# Patient Record
Sex: FEMALE | Race: WHITE | NOT HISPANIC OR LATINO | Employment: OTHER | URBAN - METROPOLITAN AREA
[De-identification: names, ages, dates, MRNs, and addresses within clinical notes are randomized per-mention and may not be internally consistent; named-entity substitution may affect disease eponyms.]

---

## 2017-06-16 ENCOUNTER — TELEPHONE (OUTPATIENT)
Dept: OBSTETRICS AND GYNECOLOGY | Facility: CLINIC | Age: 72
End: 2017-06-16

## 2017-06-20 ENCOUNTER — TELEPHONE (OUTPATIENT)
Dept: OBSTETRICS AND GYNECOLOGY | Facility: CLINIC | Age: 72
End: 2017-06-20

## 2017-06-20 NOTE — TELEPHONE ENCOUNTER
----- Message from Nico Lofton sent at 6/20/2017  2:24 PM CDT -----  Contact: mayco / Luisito Macias is checking status of forms faxed over .  Forms are needed by tomorrow 6/21/17.  Please call Mayoc at .    Thanks       06/20/2017 2:47 PM  Spoke with someone from [facility] and informed them that  wants to see pt before he puts in referral

## 2017-06-20 NOTE — TELEPHONE ENCOUNTER
----- Message from Luis Brown MD sent at 6/19/2017 12:29 PM CDT -----  Please make an appointment. I would like to see you in my office.   ----- Message -----  From: Dejah Sutton MA  Sent: 6/16/2017   9:04 AM  To: Luis Brown MD    Pt's insurance is requesting a referral to physical therapy for her knee and leaking urine.     06/20/2017 8:59 AM   Spoke with pt and informed her that  would like to see her in the office regarding her urine leakage pt stated she had appt scheduled for next week to come in.

## 2017-06-29 ENCOUNTER — OFFICE VISIT (OUTPATIENT)
Dept: OBSTETRICS AND GYNECOLOGY | Facility: CLINIC | Age: 72
End: 2017-06-29
Payer: MEDICARE

## 2017-06-29 ENCOUNTER — OFFICE VISIT (OUTPATIENT)
Dept: UROLOGY | Facility: CLINIC | Age: 72
End: 2017-06-29
Payer: MEDICARE

## 2017-06-29 VITALS
RESPIRATION RATE: 16 BRPM | HEART RATE: 68 BPM | BODY MASS INDEX: 29.44 KG/M2 | SYSTOLIC BLOOD PRESSURE: 112 MMHG | WEIGHT: 160 LBS | DIASTOLIC BLOOD PRESSURE: 60 MMHG | HEIGHT: 62 IN

## 2017-06-29 VITALS
BODY MASS INDEX: 29.62 KG/M2 | HEIGHT: 62 IN | DIASTOLIC BLOOD PRESSURE: 74 MMHG | WEIGHT: 160.94 LBS | SYSTOLIC BLOOD PRESSURE: 125 MMHG

## 2017-06-29 DIAGNOSIS — Z01.419 ENCOUNTER FOR GYNECOLOGICAL EXAMINATION WITHOUT ABNORMAL FINDING: ICD-10-CM

## 2017-06-29 DIAGNOSIS — Z79.890 HORMONE REPLACEMENT THERAPY (HRT): ICD-10-CM

## 2017-06-29 DIAGNOSIS — N39.46 MIXED INCONTINENCE URGE AND STRESS: Primary | ICD-10-CM

## 2017-06-29 DIAGNOSIS — Z90.710 STATUS POST HYSTERECTOMY: ICD-10-CM

## 2017-06-29 DIAGNOSIS — N39.3 URINARY, INCONTINENCE, STRESS FEMALE: ICD-10-CM

## 2017-06-29 DIAGNOSIS — N95.1 MENOPAUSAL STATE: Primary | ICD-10-CM

## 2017-06-29 DIAGNOSIS — R39.89 BLADDER PAIN: ICD-10-CM

## 2017-06-29 DIAGNOSIS — Z00.00 ANNUAL PHYSICAL EXAM: ICD-10-CM

## 2017-06-29 DIAGNOSIS — R92.30 DENSE BREAST TISSUE: ICD-10-CM

## 2017-06-29 PROCEDURE — 1126F AMNT PAIN NOTED NONE PRSNT: CPT | Mod: S$GLB,,, | Performed by: UROLOGY

## 2017-06-29 PROCEDURE — 1159F MED LIST DOCD IN RCRD: CPT | Mod: S$GLB,,, | Performed by: OBSTETRICS & GYNECOLOGY

## 2017-06-29 PROCEDURE — 99203 OFFICE O/P NEW LOW 30 MIN: CPT | Mod: S$GLB,,, | Performed by: UROLOGY

## 2017-06-29 PROCEDURE — 99999 PR PBB SHADOW E&M-EST. PATIENT-LVL III: CPT | Mod: PBBFAC,,, | Performed by: OBSTETRICS & GYNECOLOGY

## 2017-06-29 PROCEDURE — G0101 CA SCREEN;PELVIC/BREAST EXAM: HCPCS | Mod: S$GLB,,, | Performed by: OBSTETRICS & GYNECOLOGY

## 2017-06-29 PROCEDURE — 1159F MED LIST DOCD IN RCRD: CPT | Mod: S$GLB,,, | Performed by: UROLOGY

## 2017-06-29 PROCEDURE — 99999 PR PBB SHADOW E&M-EST. PATIENT-LVL III: CPT | Mod: PBBFAC,,, | Performed by: UROLOGY

## 2017-06-29 RX ORDER — OXYBUTYNIN CHLORIDE 5 MG/1
5 TABLET, EXTENDED RELEASE ORAL DAILY
Qty: 30 TABLET | Refills: 11 | Status: SHIPPED | OUTPATIENT
Start: 2017-06-29 | End: 2017-08-24 | Stop reason: SDUPTHER

## 2017-06-29 NOTE — PROGRESS NOTES
"  Subjective:       Bita Espinosa is a 71 y.o. female who is a new patient who was referred by Dr Brown for evaluation of UI.      Urinary Incontinence  Patient complains of urinary incontinence. This has been present for several years. She leaks urine with coughing, sneezing, with urge. Patient describes the symptoms as urge to urinate with little or no warning, urine leakage with coughing/heavy physical activity and urine leaking unpredictably. Factors associated with symptoms include none known. Evaluation to date includes none. Treatment to date includes none.     Reports TERRENCE and likely UUI though difficult to characterize the UUI. Also c/o intermittent bladder pressure/pain. Nocturia x 0. Denies UTIs. Denies KATRINA. Occasional constipation.     She went to [facility] for PFPT x 2 visits. Needs referral.    PVR (bladder scan) today - 0cc        The following portions of the patient's history were reviewed and updated as appropriate: allergies, current medications, past family history, past medical history, past social history, past surgical history and problem list.    Review of Systems  Constitutional: no fever or chills  ENT: no nasal congestion or sore throat  Respiratory: no cough or shortness of breath  Cardiovascular: no chest pain or palpitations  Gastrointestinal: no nausea or vomiting, tolerating diet  Genitourinary: as per HPI  Hematologic/Lymphatic: no easy bruising or lymphadenopathy  Musculoskeletal: no arthralgias or myalgias  Skin: no rashes or lesions  Neurological: no seizures or tremors  Behavioral/Psych: no auditory or visual hallucinations       Objective:    Vitals: /60   Pulse 68   Resp 16   Ht 5' 2" (1.575 m)   Wt 72.6 kg (160 lb)   BMI 29.26 kg/m²     Physical Exam   General: well developed, well nourished in no acute distress  Head: normocephalic, atraumatic  Neck: supple, trachea midline, no obvious enlargement of thyroid  HEENT: EOMI, mucus membranes moist, sclera anicteric, " no hearing impairment  Lungs: symmetric expansion, non-labored breathing  Cardiovascular: regular rate and rhythm, normal pulses  Abdomen: soft, non tender, non distended, no palpable masses, no hepatosplenomegaly, no hernias, no CVA tenderness  Musculoskeletal: no peripheral edema, normal ROM in bilateral upper and lower extremities  Lymphatics: no cervical or inguinal lymphadenopathy  Skin: no rashes or lesions  Neuro: alert and oriented x 3, no gross deficits  Psych: normal judgment and insight, normal mood/affect and non-anxious  Genitourinary:   patient declined exam      Lab Review   Urine analysis today in clinic shows negative for all components     No results found for: WBC, HGB, HCT, MCV, PLT  No results found for: CREATININE, BUN      Imaging  NA       Assessment/Plan:      1. Mixed incontinence urge and stress    - Discussed difference of UUI and TERRENCE components. Reviewed etiology and workup of each.   - TERRENCE: Kegels, PFPT, pessary, bulking agent, MUS.   - UUI: Behavioral changes, PFPT, anticholinergics, mirabegron. Botox/InterStim for refractory UUI.   - Will trial Ditropan XL 5mg   - PFPT for TERRENCE. May need MUS.   - May need UDS if MUS considered     2. Bladder pain    - PFPT         Follow up in 2 months

## 2017-06-29 NOTE — PROGRESS NOTES
Subjective:      Chief Complaint:  USI  Chief Complaint   Patient presents with    Gynecologic Exam       Menstrual History:    OB History      Para Term  AB Living    2         2    SAB TAB Ectopic Multiple Live Births                       Menarche age: 15     No LMP recorded. Patient has had a hysterectomy.               Objective:        History of Present Illness AND  Examination detailed DICTATE:    MENOPAUSAL   GR  2         PAST   HX   NOTED   SURGICAL  HX NOTED  USI   INVOLUNTARY   LOSS  OF   URINE URO  CONSULT                    Physical Exam   Constitutional: She is oriented to person, place, and time. She appears well-developed and well-nourished. No distress.   HENT:   Head: Normoc  Neck: Neck supple..   Cardiovascular: Normal rate, regular rhythm and normal heart sounds. No murmur heard.  Pulmonary/Chest: Effort normal and breath sounds normal. No respiratory distress. She has no wheezes. She has no rales.   Abdominal: Bowel sounds are normal. She exhibits no distension and no mass. There is no tenderness. There is no rebound and no guarding VENTRAL   HERNIA  SMALL  Musculoskeletal: Normal range of motion.   Lymphadenopathy:        Right: No inguinal adenopathy present.        Left: No inguinal adenopathy present.   Neurological: She is alert and oriented.  Skin: Skin is warm. No rash noted.    BREASTNODULAR   NOSKIN   CHANGES   NO  DISCHARGE  AX   NEG      PELVICEXT  NORMAL  BUS   NEG   VAGINA   GOOD   RUGAL   PATTERN   GOOD  SUPORT   CX AND   UT  IS   ABSENT   ADNEXA  NOT  PALPABLE        Review of Systems  Review of Systems   Normal ROS:   Constitutional: Negative for fever, chills, activity change fatigue and unexpected weight rowe  Eyes: REACT   Respiratory: Negative for shortness of breath and wheezing.    Cardiovascular: Negative for chest pain, palpitations.   Gastrointestinal: Negative for abdominal pain, diarrhea, constipation, blood in stool and abdominal distention.    Musculoskeletal: Negative for back pain.   Allergic/Immunologic: Negative  Neurological: Negative .   Hematological: Negative .   Psychiatric/Behavioral: Negative.    Assessment:      Diagnosis: MENOPAUSAL    USI  GYN  EXAM       Plan:      Return in 12  months

## 2017-07-20 ENCOUNTER — TELEPHONE (OUTPATIENT)
Dept: OBSTETRICS AND GYNECOLOGY | Facility: CLINIC | Age: 72
End: 2017-07-20

## 2017-07-20 NOTE — TELEPHONE ENCOUNTER
----- Message from Reema Dill sent at 7/19/2017  4:10 PM CDT -----  Contact: self  Patient is requesting to be scheduled for breast u/s , mammogram , & bone density @[facility] in [place] . Their phone # is [phone] .   pts #     LL

## 2017-07-20 NOTE — TELEPHONE ENCOUNTER
Left pt vm stating that  is out of the office until Tuesday and we will call her when her orders are faxed over.

## 2017-07-26 ENCOUNTER — TELEPHONE (OUTPATIENT)
Dept: OBSTETRICS AND GYNECOLOGY | Facility: CLINIC | Age: 72
End: 2017-07-26

## 2017-07-26 NOTE — TELEPHONE ENCOUNTER
----- Message from Ania Baum sent at 7/26/2017  9:13 AM CDT -----  Contact: self  Patient called back to get orders faxed over to [facility]. Please contact patient at [phone] when completed.    Thanks!

## 2017-08-23 ENCOUNTER — TELEPHONE (OUTPATIENT)
Dept: OBSTETRICS AND GYNECOLOGY | Facility: CLINIC | Age: 72
End: 2017-08-23

## 2017-08-23 NOTE — TELEPHONE ENCOUNTER
----- Message from Reema Dill sent at 8/22/2017  2:16 PM CDT -----  Contact: self  Patient is requesting orders to be re-faxed to [facility] scheduling dept . Fax # [phone]   pts # [phone]

## 2017-08-24 ENCOUNTER — OFFICE VISIT (OUTPATIENT)
Dept: UROLOGY | Facility: CLINIC | Age: 72
End: 2017-08-24
Payer: MEDICARE

## 2017-08-24 VITALS
BODY MASS INDEX: 29.45 KG/M2 | HEART RATE: 68 BPM | HEIGHT: 62 IN | DIASTOLIC BLOOD PRESSURE: 60 MMHG | WEIGHT: 160.06 LBS | SYSTOLIC BLOOD PRESSURE: 122 MMHG | RESPIRATION RATE: 14 BRPM

## 2017-08-24 DIAGNOSIS — R39.89 BLADDER PAIN: ICD-10-CM

## 2017-08-24 DIAGNOSIS — N39.46 MIXED INCONTINENCE URGE AND STRESS: Primary | ICD-10-CM

## 2017-08-24 PROCEDURE — 1125F AMNT PAIN NOTED PAIN PRSNT: CPT | Mod: S$GLB,,, | Performed by: UROLOGY

## 2017-08-24 PROCEDURE — 3008F BODY MASS INDEX DOCD: CPT | Mod: S$GLB,,, | Performed by: UROLOGY

## 2017-08-24 PROCEDURE — 1159F MED LIST DOCD IN RCRD: CPT | Mod: S$GLB,,, | Performed by: UROLOGY

## 2017-08-24 PROCEDURE — 99999 PR PBB SHADOW E&M-EST. PATIENT-LVL III: CPT | Mod: PBBFAC,,, | Performed by: UROLOGY

## 2017-08-24 PROCEDURE — 99214 OFFICE O/P EST MOD 30 MIN: CPT | Mod: S$GLB,,, | Performed by: UROLOGY

## 2017-08-24 RX ORDER — DULOXETIN HYDROCHLORIDE 60 MG/1
60 CAPSULE, DELAYED RELEASE ORAL NIGHTLY
Refills: 6 | COMMUNITY
Start: 2017-08-02

## 2017-08-24 RX ORDER — PRAMIPEXOLE DIHYDROCHLORIDE 1 MG/1
TABLET ORAL
Refills: 5 | COMMUNITY
Start: 2017-08-02

## 2017-08-24 RX ORDER — OXYBUTYNIN CHLORIDE 5 MG/1
5 TABLET, EXTENDED RELEASE ORAL DAILY
Qty: 90 TABLET | Refills: 3 | Status: SHIPPED | OUTPATIENT
Start: 2017-08-24 | End: 2018-09-27 | Stop reason: SDUPTHER

## 2017-08-24 NOTE — PROGRESS NOTES
"  Subjective:       Bita Espinosa is a 72 y.o. female who is an established patient who was referred by Dr Brown for evaluation of UI.      Urinary Incontinence  Patient complains of urinary incontinence. This has been present for several years. She leaks urine with coughing, sneezing, with urge. Patient describes the symptoms as urge to urinate with little or no warning, urine leakage with coughing/heavy physical activity and urine leaking unpredictably. Factors associated with symptoms include none known. Evaluation to date includes none. Treatment to date includes none.     Reports TERRENCE and likely UUI though difficult to characterize the UUI. Also c/o intermittent bladder pressure/pain. Nocturia x 0. Denies UTIs. Denies KATRINA. Occasional constipation.     She went to [facility] for PFPT x 2 visits. Her therapist left and she has not been back.     PVR (bladder scan) initial visit - 0cc    She was given Ditropan 5mg at initial visit. She reports she is doing well - pleased with her symptoms.       The following portions of the patient's history were reviewed and updated as appropriate: allergies, current medications, past family history, past medical history, past social history, past surgical history and problem list.    Review of Systems  Constitutional: no fever or chills  ENT: no nasal congestion or sore throat  Respiratory: no cough or shortness of breath  Cardiovascular: no chest pain or palpitations  Gastrointestinal: no nausea or vomiting, tolerating diet  Genitourinary: as per HPI  Hematologic/Lymphatic: no easy bruising or lymphadenopathy  Musculoskeletal: no arthralgias or myalgias  Skin: no rashes or lesions  Neurological: no seizures or tremors  Behavioral/Psych: no auditory or visual hallucinations       Objective:    Vitals: /60   Pulse 68   Resp 14   Ht 5' 2" (1.575 m)   Wt 72.6 kg (160 lb 0.9 oz)   BMI 29.27 kg/m²     Physical Exam   General: well developed, well nourished in no acute " distress  Head: normocephalic, atraumatic  Neck: supple, trachea midline, no obvious enlargement of thyroid  HEENT: EOMI, mucus membranes moist, sclera anicteric, no hearing impairment  Lungs: symmetric expansion, non-labored breathing  Cardiovascular: regular rate and rhythm, normal pulses  Abdomen: soft, non tender, non distended, no palpable masses, no hepatosplenomegaly, no hernias, no CVA tenderness  Musculoskeletal: no peripheral edema, normal ROM in bilateral upper and lower extremities  Lymphatics: no cervical or inguinal lymphadenopathy  Skin: no rashes or lesions  Neuro: alert and oriented x 3, no gross deficits  Psych: normal judgment and insight, normal mood/affect and non-anxious  Genitourinary:   patient declined exam      Lab Review   Urine analysis today in clinic shows negative for all components     No results found for: WBC, HGB, HCT, MCV, PLT  No results found for: CREATININE, BUN      Imaging  NA       Assessment/Plan:      1. Mixed incontinence urge and stress    - Discussed difference of UUI and TERRENCE components. Reviewed etiology and workup of each.   - TERRENCE: Kegels, PFPT, pessary, bulking agent, MUS.   - UUI: Behavioral changes, PFPT, anticholinergics, mirabegron. Botox/InterStim PRN.   - Ditropan XL 5mg - doing very well. Changed to 90day supply.   - PFPT for TERRENCE. May need MUS.   - May need UDS if MUS considered     2. Bladder pain    - PFPT         Follow up in 6 months

## 2017-09-13 ENCOUNTER — TELEPHONE (OUTPATIENT)
Dept: OBSTETRICS AND GYNECOLOGY | Facility: CLINIC | Age: 72
End: 2017-09-13

## 2017-09-13 NOTE — TELEPHONE ENCOUNTER
----- Message from Dorian Grimm sent at 9/13/2017 12:44 PM CDT -----  Contact: [phone]  Calling to speak with nurse to get test results from bone density test,that was taken about two weeks ago.

## 2017-09-13 NOTE — TELEPHONE ENCOUNTER
Request routed to , pt aware  is out for the rest of the day and he will be back tomorrow morning.

## 2017-09-13 NOTE — TELEPHONE ENCOUNTER
Patient is aware that Dr Brown will sign off on Dxa scan  the office will call her with results. kristal

## 2017-11-12 DIAGNOSIS — N95.1 SYMPTOMATIC MENOPAUSAL OR FEMALE CLIMACTERIC STATES: ICD-10-CM

## 2017-11-13 RX ORDER — ESTRADIOL CYPIONATE 5 MG/ML
5 INJECTION INTRAMUSCULAR
Qty: 5 ML | Refills: 3 | Status: SHIPPED | OUTPATIENT
Start: 2017-11-13 | End: 2018-11-13

## 2017-12-18 ENCOUNTER — TELEPHONE (OUTPATIENT)
Dept: OBSTETRICS AND GYNECOLOGY | Facility: CLINIC | Age: 72
End: 2017-12-18

## 2017-12-18 NOTE — TELEPHONE ENCOUNTER
----- Message from Paty Nelson sent at 12/18/2017 12:29 PM CST -----  Contact: self  DEPO-ESTRADIOL 5 mg/mL injection    Patient states that [facility] would not give her the refill. She can be reached at [phone]. Thank you!  ----------------------------------------------------------------------  12/18/17 @ 8374 ([facility])   SPOKE WITH MS GABI, SHE STATED THE SHE HAS BEEN  TAKING HER HRT INJECTIONS 2 X A MONTH , THAT DR NIELSEN HAS ORDERED THAT FOR HER , INFORMED HER THAT I WOULD CLARIFY THAT WITH DR CARRILLO,    INFORMED HER THAT DR CARRILLO STATED SHE IS TAKING TOO MUCH DEPO CYPIONATE AND IS TO ONLY TAKE IT 1 X A MONTH, PT STATED HER UNDERSTANDING  --------------------------------------------------------------------  12/18/17 @ 6143 ([facility])   SPOKE WITH [facility] PHARMACIST , INFORMED HER THAT DR CARRILLO STATED TO FILL RX FOR DEPO CYPINOATE AS ORDERED , 5MG /1CC TO BE GIVEN IM 1X A MONTH, PHARMACIST STATED HER UNDERSTANDING

## 2018-02-09 ENCOUNTER — TELEPHONE (OUTPATIENT)
Dept: OBSTETRICS AND GYNECOLOGY | Facility: CLINIC | Age: 73
End: 2018-02-09

## 2018-02-09 NOTE — TELEPHONE ENCOUNTER
----- Message from Andrew Valencia sent at 2/9/2018  2:22 PM CST -----  Contact: Caleb/ZAHRA Ellis called requesting orders for breast u/s. Fax information to [phone]. Contact pt at [phone]. Contact Caleb at [phone].    Thanks-

## 2018-02-15 ENCOUNTER — TELEPHONE (OUTPATIENT)
Dept: OBSTETRICS AND GYNECOLOGY | Facility: CLINIC | Age: 73
End: 2018-02-15

## 2018-02-15 NOTE — TELEPHONE ENCOUNTER
----- Message from Dorian Grimm sent at 2/15/2018  3:50 PM CST -----  Contact: Luiz Ingram /julianna Poon [phone]  Calling TO speak with nurse to re-fax orders for breast Us diagnostic Mammo for tomorrow

## 2018-09-24 ENCOUNTER — TELEPHONE (OUTPATIENT)
Dept: OBSTETRICS AND GYNECOLOGY | Facility: CLINIC | Age: 73
End: 2018-09-24

## 2018-09-24 NOTE — TELEPHONE ENCOUNTER
----- Message from Sarah Henning sent at 9/24/2018  3:35 PM CDT -----  Contact: Self/ [phone]  Pt calling to schedule mammogram and ultrasound for her breasts. Says she received a reminder letter and it is time to schedule. Please place [facility] to [facility] and call to schedule. Thank you.  -----------------------------------------------------  9/24/18 @ 1552 ([facility])  INCOMING CALL FROM MS ASHLEY PT IS REQUESTING MMG ORDER AND IT BE SENT TO [facility]

## 2018-09-25 ENCOUNTER — TELEPHONE (OUTPATIENT)
Dept: OBSTETRICS AND GYNECOLOGY | Facility: CLINIC | Age: 73
End: 2018-09-25

## 2018-09-25 DIAGNOSIS — N95.1 MENOPAUSAL STATE: Primary | ICD-10-CM

## 2018-09-25 DIAGNOSIS — N60.09 BENIGN BREAST CYST IN FEMALE, UNSPECIFIED LATERALITY: Primary | ICD-10-CM

## 2018-09-25 NOTE — TELEPHONE ENCOUNTER
9/24/18 @ 0841 ([facility])   SPOKE WITH MS GABI, INFORMED HER THAT SHE CAN COME  HER MMG ORDER, PT STATED HER UNDERSTANDING

## 2018-09-27 ENCOUNTER — TELEPHONE (OUTPATIENT)
Dept: UROLOGY | Facility: CLINIC | Age: 73
End: 2018-09-27

## 2018-09-27 RX ORDER — OXYBUTYNIN CHLORIDE 5 MG/1
5 TABLET, EXTENDED RELEASE ORAL DAILY
Qty: 90 TABLET | Refills: 0 | Status: SHIPPED | OUTPATIENT
Start: 2018-09-27 | End: 2018-12-27 | Stop reason: SDUPTHER

## 2018-09-27 NOTE — TELEPHONE ENCOUNTER
Spoke to pt advised rx for oxybutynin sent to pharmacy an follow up appt made with nohemy [email or web address]

## 2018-10-01 ENCOUNTER — TELEPHONE (OUTPATIENT)
Dept: OBSTETRICS AND GYNECOLOGY | Facility: CLINIC | Age: 73
End: 2018-10-01

## 2018-10-01 NOTE — TELEPHONE ENCOUNTER
----- Message from Brielle Hendrix sent at 10/1/2018  2:43 PM CDT -----  Contact: blaine Hyde calling to speak to a nurse regarding health. [phone] ext [phone]  ----------------------------------------------------  10/1/18 @ 1508 ([facility])  CALL ATTEMPT TO MEKA @ [place] UNSUCCESSFUL , UNABLE TO LEAVE A MESSAGE @  [phone] EXT [phone], FOR HER  TO RETURN CALL TO OFFICE CONCERNING THE PT  ---------------------------------------------  CALL ATTEMPT TO PT UNSUCCESSFUL , MESSAGE LEFT FOR PT TO RETURN CALL TO OFFICE CONCERNING WHY BLAINE FROM [facility] WAS CALLING OUR OFFICE , INFORMED PT THAT PHONE # BLAINE GAVE IS CONSTANTLY BUSY, UNABLE TO REACH HER

## 2018-10-26 DIAGNOSIS — N95.1 SYMPTOMATIC MENOPAUSAL OR FEMALE CLIMACTERIC STATES: ICD-10-CM

## 2018-10-29 RX ORDER — ESTRADIOL CYPIONATE 5 MG/ML
5 INJECTION INTRAMUSCULAR
Qty: 5 ML | Refills: 3 | OUTPATIENT
Start: 2018-10-29 | End: 2019-10-29

## 2018-11-11 DIAGNOSIS — N95.1 SYMPTOMATIC MENOPAUSAL OR FEMALE CLIMACTERIC STATES: ICD-10-CM

## 2018-11-12 RX ORDER — ESTRADIOL CYPIONATE 5 MG/ML
5 INJECTION INTRAMUSCULAR
Qty: 5 ML | Refills: 3 | OUTPATIENT
Start: 2018-11-12 | End: 2019-11-12

## 2018-12-27 RX ORDER — OXYBUTYNIN CHLORIDE 5 MG/1
5 TABLET, EXTENDED RELEASE ORAL DAILY
Qty: 90 TABLET | Refills: 0 | Status: SHIPPED | OUTPATIENT
Start: 2018-12-27 | End: 2019-12-27

## 2019-02-04 DIAGNOSIS — N95.1 SYMPTOMATIC MENOPAUSAL OR FEMALE CLIMACTERIC STATES: ICD-10-CM

## 2019-02-05 RX ORDER — ESTRADIOL CYPIONATE 5 MG/ML
5 INJECTION INTRAMUSCULAR
Qty: 5 ML | Refills: 3 | OUTPATIENT
Start: 2019-02-05 | End: 2020-02-05

## 2019-02-06 ENCOUNTER — TELEPHONE (OUTPATIENT)
Dept: OBSTETRICS AND GYNECOLOGY | Facility: CLINIC | Age: 74
End: 2019-02-06

## 2019-02-06 NOTE — TELEPHONE ENCOUNTER
2/6/19 @ 1618  INCOMING CALL FROM [facility] ,PT REQUESTING REFILL ON : DEPO-ESTRADIOL 5 mg/mL injection 5 mL   MESSAGE SENT TO DR CARRILLO      ----- Message from Susan Huertas sent at 2/6/2019  3:01 PM CST -----  Contact: Self  Patient called to request a new prescription for listed injection. Please call geremias lizarraga at [phone]                    [facility] #[id number] - [place], [place] - [address]

## 2019-02-07 ENCOUNTER — TELEPHONE (OUTPATIENT)
Dept: OBSTETRICS AND GYNECOLOGY | Facility: CLINIC | Age: 74
End: 2019-02-07

## 2019-02-07 DIAGNOSIS — N95.1 SYMPTOMATIC MENOPAUSAL OR FEMALE CLIMACTERIC STATES: ICD-10-CM

## 2019-02-07 NOTE — TELEPHONE ENCOUNTER
----- Message from Sarah Flores sent at 2/7/2019  2:35 PM CST -----  Contact: pt  Can the clinic reply in [facility]:       Please refill the medication(s) listed below. The patient can be reached at this phone number (_[phone]____) once it is called into the pharmacy.      Medication #1DEPO-ESTRADIOL 5 mg/mL injection       Medication #2      [facility] [place]       Pt is aware that that medication will be refilled once she come to her visit

## 2019-02-07 NOTE — TELEPHONE ENCOUNTER
MD Ventura Stark, LPN   Caller: Unspecified (Yesterday,  4:17 PM)             Pt   Needs  appt  denied      2/7/19 @ 0942  CALL ATTEMPT TO PT UNSUCCESSFUL , MESSAGE LEFT FOR PT TO RETURN CALL TO OFFICE CONCERNING MAKING AN APPT TO SEE DR CARRILLO

## 2019-02-12 RX ORDER — ESTRADIOL CYPIONATE 5 MG/ML
5 INJECTION INTRAMUSCULAR
Qty: 5 ML | Refills: 3 | OUTPATIENT
Start: 2019-02-12 | End: 2020-02-12

## 2019-03-12 ENCOUNTER — OFFICE VISIT (OUTPATIENT)
Dept: OBSTETRICS AND GYNECOLOGY | Facility: CLINIC | Age: 74
End: 2019-03-12
Payer: MEDICARE

## 2019-03-12 VITALS
WEIGHT: 206.69 LBS | BODY MASS INDEX: 38.03 KG/M2 | DIASTOLIC BLOOD PRESSURE: 78 MMHG | HEIGHT: 62 IN | SYSTOLIC BLOOD PRESSURE: 108 MMHG

## 2019-03-12 DIAGNOSIS — Z90.710 STATUS POST HYSTERECTOMY: ICD-10-CM

## 2019-03-12 DIAGNOSIS — N39.3 URINARY, INCONTINENCE, STRESS FEMALE: ICD-10-CM

## 2019-03-12 DIAGNOSIS — Z01.419 ENCOUNTER FOR GYNECOLOGICAL EXAMINATION WITHOUT ABNORMAL FINDING: Primary | ICD-10-CM

## 2019-03-12 DIAGNOSIS — N95.1 MENOPAUSAL STATE: ICD-10-CM

## 2019-03-12 PROCEDURE — 3288F FALL RISK ASSESSMENT DOCD: CPT | Mod: CPTII,S$GLB,, | Performed by: OBSTETRICS & GYNECOLOGY

## 2019-03-12 PROCEDURE — 1100F PTFALLS ASSESS-DOCD GE2>/YR: CPT | Mod: CPTII,S$GLB,, | Performed by: OBSTETRICS & GYNECOLOGY

## 2019-03-12 PROCEDURE — 99999 PR PBB SHADOW E&M-EST. PATIENT-LVL IV: ICD-10-PCS | Mod: PBBFAC,,, | Performed by: OBSTETRICS & GYNECOLOGY

## 2019-03-12 PROCEDURE — 1100F PR PT FALLS ASSESS DOC 2+ FALLS/FALL W/INJURY/YR: ICD-10-PCS | Mod: CPTII,S$GLB,, | Performed by: OBSTETRICS & GYNECOLOGY

## 2019-03-12 PROCEDURE — 99999 PR PBB SHADOW E&M-EST. PATIENT-LVL IV: CPT | Mod: PBBFAC,,, | Performed by: OBSTETRICS & GYNECOLOGY

## 2019-03-12 PROCEDURE — G0101 CA SCREEN;PELVIC/BREAST EXAM: HCPCS | Mod: S$GLB,,, | Performed by: OBSTETRICS & GYNECOLOGY

## 2019-03-12 PROCEDURE — 3288F PR FALLS RISK ASSESSMENT DOCUMENTED: ICD-10-PCS | Mod: CPTII,S$GLB,, | Performed by: OBSTETRICS & GYNECOLOGY

## 2019-03-12 PROCEDURE — G0101 PR CA SCREEN;PELVIC/BREAST EXAM: ICD-10-PCS | Mod: S$GLB,,, | Performed by: OBSTETRICS & GYNECOLOGY

## 2019-03-12 NOTE — PROGRESS NOTES
Subjective:      Chief Complaint:    Chief Complaint   Patient presents with    Well Woman       Menstrual History:    OB History      Para Term  AB Living    2         2    SAB TAB Ectopic Multiple Live Births                       Menarche age: 15     No LMP recorded. Patient has had a hysterectomy.                Objective:      HISTORY OF PRESENT ILLNESS:  The patient is 73 years of age.  Here for annual   exam.  The patient is a  2, para 2.  Mammogram in 2018, negative.  Pap   smear in 2015 is negative.  Mammogram last year, breast cyst aspiration.  Pap   smear in 2015, negative.    PAST MEDICAL HISTORY:  GERD syndrome, hypertension, urinary stress incontinence,   possible heart disease.    PAST SURGICAL HISTORY:  Hysterectomy, BSO, total , multiple plastic   surgical procedure, bilateral knee replacement.  The patient also recently   started with some leg swelling and possible cardiac problem and has been on   hormone replacement therapy, Depo-Estradiol, testosterone injection.    PHYSICAL EXAMINATION:  VITAL SIGNS:  Blood pressure 108/78, weight 206.  BREASTS:  No lumps, mass, discharge, skin changes, retraction, nipple changes.    Axilla negative.  Dense breast noted.  PELVIC:  External normal.  Vulva normal.  Bartholin, urethral and [place]   negative.  Vagina is clear.  Cervix, uterus, adnexa absent.  Good apical   support.  RECTAL:  Negative.    IMPRESSION:  Post-menopausal, post-hysterectomy.    PLAN:  Discontinue estrogen and testosterone injection until the patient goes to   cardiac evaluation and depending on findings, decide to follow up and possible   reinstitution of estrogen therapy, multivitamins daily, calcium, vitamin D,   increased activity.  Mammogram due in October.  We will see the patient back   within a year for pelvic GYN followup examination.      FREDY  dd: 2019 15:19:44 (CDT)  td: 2019 05:29:15 (CDT)  Doc ID   #[id number]  Job ID #[id number]    CC:        History of Present Illness AND  Examination detailed DICTATE:        Physical Exam   Constitutional: She is oriented to person, place, and time. She appears well-developed and well-nourished. No distress.   HENT:   Head: Normocepha  Eyes: Pupils are equal, round, and reactive to light.   Neck: Neck supple.   Cardiovascular: Normal rate, regular rhythm and normal heart sounds. No murmur heard.  Pulmonary/Chest: Effort normal and breath sounds normal. No respiratory distress. She has no wheezes. She has no rales. She exhibits no tenderness.   Abdominal: Bowel sounds are normal. She exhibits no distension and no mass. There is no tenderness. There is no rebound and no guarding.   Musculoskeletal: Normal range of motion.   Lymphadenopathy:        Right: No inguinal adenopathy present.        Left: No inguinal adenopathy present.   Neurological: She is alert and oriented.  Skin: Skin is warm. No rash noted.        Review of Systems  Review of Systems   Normal ROS:   Constitutional: Negative for fever, chills, activity change fatigue and unexpected weight change.   HENT: Negative for nosebleeds, congestion.  Eyes: Negative for visual disturbance.   Respiratory: Negative for shortness of breath and wheezing.    Cardiovascular: Negative for chest pain, palpitations.   Gastrointestinal: Negative for abdominal pain, diarrhea, constipation, blood in stool and abdominal distention.   Musculoskeletal: Negative for back pain.   Allergic/Immunologic: Negative   Neurological: Negative .   Hematological: Negative.   Psychiatric/Behavioral: Negative     Assessment:      DiagnosisMENOPAUSAL    GYN   EXAM    :        Plan:      Return in 12  months

## 2019-03-12 NOTE — PATIENT INSTRUCTIONS

## 2019-04-18 ENCOUNTER — TELEPHONE (OUTPATIENT)
Dept: OBSTETRICS AND GYNECOLOGY | Facility: CLINIC | Age: 74
End: 2019-04-18

## 2019-04-18 NOTE — TELEPHONE ENCOUNTER
4/119 @ 0961  SPOKE WITH MS ASHLEY, INFORMED HER THAT DR CARRILLO NEEDS HER TO GET A CARDIAC CLEARANCE BEFORE HER CAN CONTINUE HER ON HRT INJECTIONS. PT STATED HER UNDERSTANDING    ----- Message from Susan Huertas sent at 4/18/2019 10:54 AM CDT -----  Contact: Self  Patient called to request to start receiving hormone injections again. Please call to advise at [phone].

## 2019-06-13 ENCOUNTER — OFFICE VISIT (OUTPATIENT)
Dept: SURGERY | Facility: CLINIC | Age: 74
End: 2019-06-13
Payer: MEDICARE

## 2019-06-13 ENCOUNTER — OFFICE VISIT (OUTPATIENT)
Dept: URGENT CARE | Facility: CLINIC | Age: 74
End: 2019-06-13
Payer: MEDICARE

## 2019-06-13 VITALS
HEIGHT: 62 IN | DIASTOLIC BLOOD PRESSURE: 71 MMHG | OXYGEN SATURATION: 95 % | RESPIRATION RATE: 16 BRPM | WEIGHT: 194 LBS | BODY MASS INDEX: 35.7 KG/M2 | HEART RATE: 79 BPM | TEMPERATURE: 98 F | SYSTOLIC BLOOD PRESSURE: 166 MMHG

## 2019-06-13 VITALS
BODY MASS INDEX: 35.7 KG/M2 | WEIGHT: 194 LBS | HEIGHT: 62 IN | DIASTOLIC BLOOD PRESSURE: 57 MMHG | HEART RATE: 63 BPM | SYSTOLIC BLOOD PRESSURE: 116 MMHG | TEMPERATURE: 99 F

## 2019-06-13 DIAGNOSIS — S01.21XA LACERATION OF NOSE, INITIAL ENCOUNTER: Primary | ICD-10-CM

## 2019-06-13 DIAGNOSIS — S01.81XA FACIAL LACERATION, INITIAL ENCOUNTER: Primary | ICD-10-CM

## 2019-06-13 PROCEDURE — 99203 OFFICE O/P NEW LOW 30 MIN: CPT | Mod: 25,S$GLB,, | Performed by: SURGERY

## 2019-06-13 PROCEDURE — 1101F PR PT FALLS ASSESS DOC 0-1 FALLS W/OUT INJ PAST YR: ICD-10-PCS | Mod: CPTII,S$GLB,, | Performed by: SURGERY

## 2019-06-13 PROCEDURE — 1100F PR PT FALLS ASSESS DOC 2+ FALLS/FALL W/INJURY/YR: ICD-10-PCS | Mod: CPTII,S$GLB,, | Performed by: NURSE PRACTITIONER

## 2019-06-13 PROCEDURE — 99203 PR OFFICE/OUTPT VISIT, NEW, LEVL III, 30-44 MIN: ICD-10-PCS | Mod: S$GLB,,, | Performed by: NURSE PRACTITIONER

## 2019-06-13 PROCEDURE — 3288F FALL RISK ASSESSMENT DOCD: CPT | Mod: CPTII,S$GLB,, | Performed by: NURSE PRACTITIONER

## 2019-06-13 PROCEDURE — 3288F PR FALLS RISK ASSESSMENT DOCUMENTED: ICD-10-PCS | Mod: CPTII,S$GLB,, | Performed by: NURSE PRACTITIONER

## 2019-06-13 PROCEDURE — 12014 PR RESUPERF WND FACE 5.1-7.5 CM: ICD-10-PCS | Mod: S$GLB,,, | Performed by: SURGERY

## 2019-06-13 PROCEDURE — 99203 OFFICE O/P NEW LOW 30 MIN: CPT | Mod: S$GLB,,, | Performed by: NURSE PRACTITIONER

## 2019-06-13 PROCEDURE — 12014 RPR F/E/E/N/L/M 5.1-7.5 CM: CPT | Mod: S$GLB,,, | Performed by: SURGERY

## 2019-06-13 PROCEDURE — 1100F PTFALLS ASSESS-DOCD GE2>/YR: CPT | Mod: CPTII,S$GLB,, | Performed by: NURSE PRACTITIONER

## 2019-06-13 PROCEDURE — 99203 PR OFFICE/OUTPT VISIT, NEW, LEVL III, 30-44 MIN: ICD-10-PCS | Mod: 25,S$GLB,, | Performed by: SURGERY

## 2019-06-13 PROCEDURE — 99999 PR PBB SHADOW E&M-EST. PATIENT-LVL III: ICD-10-PCS | Mod: PBBFAC,,, | Performed by: SURGERY

## 2019-06-13 PROCEDURE — 99999 PR PBB SHADOW E&M-EST. PATIENT-LVL III: CPT | Mod: PBBFAC,,, | Performed by: SURGERY

## 2019-06-13 PROCEDURE — 1101F PT FALLS ASSESS-DOCD LE1/YR: CPT | Mod: CPTII,S$GLB,, | Performed by: SURGERY

## 2019-06-13 RX ORDER — OXYCODONE AND ACETAMINOPHEN 10; 325 MG/1; MG/1
1 TABLET ORAL
COMMUNITY

## 2019-06-13 RX ORDER — POTASSIUM CHLORIDE 750 MG/1
TABLET, EXTENDED RELEASE ORAL
COMMUNITY
Start: 2019-03-25

## 2019-06-13 RX ORDER — ASPIRIN 325 MG
TABLET, DELAYED RELEASE (ENTERIC COATED) ORAL
COMMUNITY
Start: 2019-05-31

## 2019-06-13 RX ORDER — ROSUVASTATIN CALCIUM 10 MG/1
TABLET, COATED ORAL
COMMUNITY
Start: 2019-05-21

## 2019-06-13 RX ORDER — LOSARTAN POTASSIUM 50 MG/1
50 TABLET ORAL 2 TIMES DAILY
Refills: 3 | COMMUNITY
Start: 2019-05-01

## 2019-06-13 RX ORDER — ASPIRIN 81 MG/1
81 TABLET ORAL
COMMUNITY

## 2019-06-13 RX ORDER — ASPIRIN 325 MG
50000 TABLET, DELAYED RELEASE (ENTERIC COATED) ORAL
Refills: 2 | COMMUNITY
Start: 2019-05-31

## 2019-06-13 NOTE — LETTER
June 14, 2019      Hermila Sommer, JESSY  [address]  [facility] [address]           [facility]  [address]  4th [address] [facility]  [place] [address]  Phone: [phone]          Patient: Bita Espinosa   MR Number: [id number]   YOB: 1945   Date of Visit: 6/13/2019       Dear Hermila Sommer:    Thank you for referring Bita Espinosa to me for evaluation. Attached you will find relevant portions of my assessment and plan of care.    If you have questions, please do not hesitate to call me. I look forward to following Bita Espinosa along with you.    Sincerely,    Lane Be Jr., MD    Enclosure  CC:  No Recipients    If you would like to receive this communication electronically, please contact [email or web address] or [phone] to request more information on [place] Everywhere Link access.    For providers and/or their staff who would like to refer a patient to [facility], please contact us through our one-stop-shop provider referral line, Jarad Horvath, at [phone].    If you feel you have received this communication in error or would no longer like to receive these types of communications, please e-mail [email or web address]

## 2019-06-13 NOTE — PATIENT INSTRUCTIONS
If you were prescribed a narcotic or controlled medication, do not drive or operate heavy equipment or machinery while taking these medications.  You must understand that you've received an Urgent Care treatment only and that you may be released before all your medical problems are known or treated. You, the patient, will arrange for follow up care as instructed.  Follow up with your PCP or specialty clinic as directed within 2-5 days if not improved or as needed.  You can call [phone] to schedule an appointment with the appropriate provider.  If your condition worsens we recommend that you receive another evaluation at the emergency room immediately or contact your primary medical clinics after hours call service to discuss your concerns.  Please return here or go to the Emergency Department for any concerns or worsening of condition.      Wound Check, No Infection  Your wound is healing as expected. There are no signs of infection.   Home care  Continue to care for your wound as directed.  · Cover your wound with a bandage unless your healthcare provider tells you not to.  · Gently clean your wound with soap and water when you shower.   · Unless told otherwise, avoid swimming and taking tub baths until your wound has healed.  Follow-up care  Follow up with your healthcare provider as advised.  · If you have sutures or staples, return as directed to have them removed. If they are not taken out on time, they may be harder to remove and scarring may be worse. Infection may develop.  · If surgical tape strips were used, you can remove them yourself if they have not fallen off by 10 days after they were applied.   When to seek medical advice  Call your healthcare provider right away if any of these occur:  · Fever of 100.4ºF (38ºC) or higher, or as directed by your health care provider  · Symptoms of a wound infection, including:  ¨ Redness or swelling around the wound  ¨ Warmth coming from the wound  ¨ New or  worsening pain  ¨ Red streaks around the wound  ¨ Draining pus  Date Last Reviewed: 8/24/2015  © 9521-0426 [employer]. [address] [place] [address], [place] [address]. All rights reserved. This information is not intended as a substitute for professional medical care. Always follow your healthcare professional's instructions.        Laceration: All Closures  A laceration is a cut through the skin. This will usually require stitches (sutures) or staples if it is deep. Minor cuts may be treated with a surgical tape closure or skin glue.    Home care  · Your healthcare provider may prescribe an antibiotic. This is to help prevent infection. Follow all instructions for taking this medicine. Take the medicine every day until it is gone or you are told to stop. You should not have any left over.  · The healthcare provider may prescribe medicines for pain. Follow instructions for taking them.  · Follow the healthcare providers instructions on how to care for the cut.  · Keep the wound clean and dry. Do not get the wound wet until you are told it is okay to do so. If the area gets wet, gently pat it dry with a clean cloth. Replace the wet bandage with a dry one.  · If a bandage was applied and it becomes wet or dirty, replace it. Otherwise, leave it in place for the first 24 hours.  · Caring for sutures or staples: Once you no longer need to keep them dry, clean the wound daily. First, remove the bandage. Then wash the area gently with soap and warm water, or as directed by the health care provider. Use a wet cotton swab to loosen and remove any blood or crust that forms. After cleaning, apply a thin layer of antibiotic ointment if advised. Then put on a new bandage unless you are told not to.  · Caring for skin glue: Dont put apply liquid, ointment, or cream on the wound while the glue is in place. Avoid activities that cause heavy sweating. Protect the wound from sunlight. Do not scratch, rub, or pick at the  adhesive film. Do not place tape directly over the film. The glue should peel off within 5 to 10 days.   · Caring for surgical tape: Keep the area dry. If it gets wet, blot it dry with a clean towel. Surgical tape usually falls off within 7 to 10 days. If it has not fallen off after 10 days, you can take it off yourself. Put mineral oil or petroleum jelly on a cotton ball and gently rub the tape until it is removed.  · Once you can get the wound wet, you may shower as usual but do not soak the wound in water (no tub baths or swimming)  · Even with proper treatment, a wound infection may sometimes occur. Check the wound daily for signs of infection listed below.  Scalp wounds  During the first two days, you may carefully rinse your hair in the shower to remove blood, glass or dirt particles. After two days, you may shower and shampoo your hair normally. Do not soak your scalp in the tub or go swimming until the stitches or staples have been removed. Talk with your healthcare provider before applying any antibiotic ointment to the wound.  Mouth wounds  Eat soft foods to reduce pain. If the cut is inside of your mouth, clean by rinsing after each meal and at bedtime with a mixture of equal parts water and hydrogen peroxide (do not swallow!). Or, you can use a cotton swab to directly apply hydrogen peroxide onto the cut. Mouth wounds can be painful when eating. You may use an over-the-counter local numbing solution for pain relief. If this is not available, you may use any numbing solution intended for teething babies. You may apply this directly to the sores with a cotton-tip swab or with your finger.  Follow-up care  Follow up with your healthcare provider as advised. Ask your healthcare provider how long sutures should be left in place. Be sure to return for suture removal as directed. If dissolving stitches were used in the mouth, these should fall out or dissolve without the need for removal. If tape closures were  used, remove them yourself when your provider recommends if they have not fallen off on their own. If skin glue was used, the film will wear off by itself.  When to seek medical advice  Call your healthcare provider right away if any of these occur:  · Wound bleeding not controlled by direct pressure  · Signs of infection, including increasing pain in the wound, increasing wound redness or swelling, or pus or bad odor coming from the wound  · Fever of 100.4°F (38.ºC) or higher or as directed by your healthcare provider  · Stitches or staples come apart or fall out or surgical tape falls off before 7 days  · Wound edges re-open  · Wound changes colors  · Numbness around the wound   · Decreased movement around the injured area  Date Last Reviewed: 6/14/2015  © 6109-6961 [employer]. [address] [place] [address], [place] [address]. All rights reserved. This information is not intended as a substitute for professional medical care. Always follow your healthcare professional's instructions.

## 2019-06-13 NOTE — PROGRESS NOTES
"Subjective:       Patient ID: Bita Espinosa is a 73 y.o. female.    Vitals:  height is 5' 2" (1.575 m) and weight is 88 kg (194 lb). Her oral temperature is 97.8 °F (36.6 °C). Her blood pressure is 166/71 (abnormal) and her pulse is 79. Her respiration is 16 and oxygen saturation is 95%.     Chief Complaint: Facial Laceration (nose)    Patient states she was on latter in closet and she fell and hit face on wooden shelf.    Laceration    The incident occurred 12 to 24 hours ago. The laceration is located on the face. Injury mechanism: wooden shelf. The pain is at a severity of 4/10. The pain is mild. The pain has been constant since onset. Her tetanus status is UTD.       Constitution: Negative for fatigue.   HENT: Negative for facial swelling and facial trauma.    Neck: Negative for neck stiffness.   Cardiovascular: Negative for chest trauma.   Eyes: Negative for eye trauma, double vision and blurred vision.   Gastrointestinal: Negative for abdominal trauma, abdominal pain and rectal bleeding.   Genitourinary: Negative for hematuria, missed menses, genital trauma and pelvic pain.   Musculoskeletal: Negative for pain, trauma, joint swelling and abnormal ROM of joint.   Skin: Positive for laceration. Negative for color change, wound, abrasion and bruising.   Neurological: Negative for dizziness, history of vertigo, light-headedness, coordination disturbances, altered mental status and loss of consciousness.   Hematologic/Lymphatic: Negative for history of bleeding disorder.   Psychiatric/Behavioral: Negative for altered mental status.       Objective:      Physical Exam   Constitutional: She is oriented to person, place, and time. Vital signs are normal. She appears well-developed and well-nourished. She is active and cooperative.  Non-toxic appearance. She does not have a sickly appearance. She does not appear ill. No distress.   HENT:   Head: Normocephalic and atraumatic.   Nose:       Mouth/Throat: Uvula is " midline, oropharynx is clear and moist and mucous membranes are normal.   See photo uploaded.    Eyes: Conjunctivae and lids are normal.   Neck: Trachea normal, normal range of motion, full passive range of motion without pain and phonation normal. Neck supple.   Cardiovascular: Normal rate, regular rhythm, normal heart sounds, intact distal pulses and normal pulses.   Pulses:       Radial pulses are 2+ on the right side, and 2+ on the left side.   Pulmonary/Chest: Effort normal and breath sounds normal.   Musculoskeletal: She exhibits no edema or deformity.   Neurological: She is alert and oriented to person, place, and time. She has normal strength and normal reflexes. No sensory deficit.   Skin: Skin is warm and dry. Capillary refill takes less than 2 seconds. Laceration noted. She is not diaphoretic.        Psychiatric: She has a normal mood and affect. Her speech is normal and behavior is normal. Judgment and thought content normal. Cognition and memory are normal.   Nursing note and vitals reviewed.          Assessment:       1. Facial laceration, initial encounter        Plan:       Patient concerned about scarring. Referred patient to general surgery for further evaluation. Patient agreed with tx plan.     Facial laceration, initial encounter  -     Cancel: Ambulatory referral to Plastic Surgery  -     Ambulatory referral to General Surgery      Patient Instructions   If you were prescribed a narcotic or controlled medication, do not drive or operate heavy equipment or machinery while taking these medications.  You must understand that you've received an Urgent Care treatment only and that you may be released before all your medical problems are known or treated. You, the patient, will arrange for follow up care as instructed.  Follow up with your PCP or specialty clinic as directed within 2-5 days if not improved or as needed.  You can call [phone] to schedule an appointment with the appropriate  provider.  If your condition worsens we recommend that you receive another evaluation at the emergency room immediately or contact your primary medical clinics after hours call service to discuss your concerns.  Please return here or go to the Emergency Department for any concerns or worsening of condition.      Wound Check, No Infection  Your wound is healing as expected. There are no signs of infection.   Home care  Continue to care for your wound as directed.  · Cover your wound with a bandage unless your healthcare provider tells you not to.  · Gently clean your wound with soap and water when you shower.   · Unless told otherwise, avoid swimming and taking tub baths until your wound has healed.  Follow-up care  Follow up with your healthcare provider as advised.  · If you have sutures or staples, return as directed to have them removed. If they are not taken out on time, they may be harder to remove and scarring may be worse. Infection may develop.  · If surgical tape strips were used, you can remove them yourself if they have not fallen off by 10 days after they were applied.   When to seek medical advice  Call your healthcare provider right away if any of these occur:  · Fever of 100.4ºF (38ºC) or higher, or as directed by your health care provider  · Symptoms of a wound infection, including:  ¨ Redness or swelling around the wound  ¨ Warmth coming from the wound  ¨ New or worsening pain  ¨ Red streaks around the wound  ¨ Draining pus  Date Last Reviewed: 8/24/2015  © 0724-1839 [employer]. [address] [place] [address], [place] [address]. All rights reserved. This information is not intended as a substitute for professional medical care. Always follow your healthcare professional's instructions.        Laceration: All Closures  A laceration is a cut through the skin. This will usually require stitches (sutures) or staples if it is deep. Minor cuts may be treated with a surgical tape closure or skin  glue.    Home care  · Your healthcare provider may prescribe an antibiotic. This is to help prevent infection. Follow all instructions for taking this medicine. Take the medicine every day until it is gone or you are told to stop. You should not have any left over.  · The healthcare provider may prescribe medicines for pain. Follow instructions for taking them.  · Follow the healthcare providers instructions on how to care for the cut.  · Keep the wound clean and dry. Do not get the wound wet until you are told it is okay to do so. If the area gets wet, gently pat it dry with a clean cloth. Replace the wet bandage with a dry one.  · If a bandage was applied and it becomes wet or dirty, replace it. Otherwise, leave it in place for the first 24 hours.  · Caring for sutures or staples: Once you no longer need to keep them dry, clean the wound daily. First, remove the bandage. Then wash the area gently with soap and warm water, or as directed by the health care provider. Use a wet cotton swab to loosen and remove any blood or crust that forms. After cleaning, apply a thin layer of antibiotic ointment if advised. Then put on a new bandage unless you are told not to.  · Caring for skin glue: Dont put apply liquid, ointment, or cream on the wound while the glue is in place. Avoid activities that cause heavy sweating. Protect the wound from sunlight. Do not scratch, rub, or pick at the adhesive film. Do not place tape directly over the film. The glue should peel off within 5 to 10 days.   · Caring for surgical tape: Keep the area dry. If it gets wet, blot it dry with a clean towel. Surgical tape usually falls off within 7 to 10 days. If it has not fallen off after 10 days, you can take it off yourself. Put mineral oil or petroleum jelly on a cotton ball and gently rub the tape until it is removed.  · Once you can get the wound wet, you may shower as usual but do not soak the wound in water (no tub baths or  swimming)  · Even with proper treatment, a wound infection may sometimes occur. Check the wound daily for signs of infection listed below.  Scalp wounds  During the first two days, you may carefully rinse your hair in the shower to remove blood, glass or dirt particles. After two days, you may shower and shampoo your hair normally. Do not soak your scalp in the tub or go swimming until the stitches or staples have been removed. Talk with your healthcare provider before applying any antibiotic ointment to the wound.  Mouth wounds  Eat soft foods to reduce pain. If the cut is inside of your mouth, clean by rinsing after each meal and at bedtime with a mixture of equal parts water and hydrogen peroxide (do not swallow!). Or, you can use a cotton swab to directly apply hydrogen peroxide onto the cut. Mouth wounds can be painful when eating. You may use an over-the-counter local numbing solution for pain relief. If this is not available, you may use any numbing solution intended for teething babies. You may apply this directly to the sores with a cotton-tip swab or with your finger.  Follow-up care  Follow up with your healthcare provider as advised. Ask your healthcare provider how long sutures should be left in place. Be sure to return for suture removal as directed. If dissolving stitches were used in the mouth, these should fall out or dissolve without the need for removal. If tape closures were used, remove them yourself when your provider recommends if they have not fallen off on their own. If skin glue was used, the film will wear off by itself.  When to seek medical advice  Call your healthcare provider right away if any of these occur:  · Wound bleeding not controlled by direct pressure  · Signs of infection, including increasing pain in the wound, increasing wound redness or swelling, or pus or bad odor coming from the wound  · Fever of 100.4°F (38.ºC) or higher or as directed by your healthcare  provider  · Stitches or staples come apart or fall out or surgical tape falls off before 7 days  · Wound edges re-open  · Wound changes colors  · Numbness around the wound   · Decreased movement around the injured area  Date Last Reviewed: 6/14/2015  © 7386-6703 [employer]. [address] [place] [address]. All rights reserved. This information is not intended as a substitute for professional medical care. Always follow your healthcare professional's instructions.

## 2019-06-14 NOTE — H&P
[facility]  OUTPATIENT H&P    REASON FOR VISIT/CC:  Laceration to bridge of nose    HPI: Bita Espinosa is a 73 y.o. female suffered a laceration to the bridge of her nose approximately 16 hr prior to presentation.  The patient was retrieving a large box from her closet when she fell backwards and hit her nose on a shelf.  She had profuse bleeding but this was controlled with pressure.  She ended up going to an urgent care this morning who originally referred her to Plastic surgery but then made an appointment for General surgery. Patient reports pain in soreness at the site of injury.  The bleeding has stopped.  She is concerned about scarring.  She denies fevers or chills.    I have reviewed the patient's chart including prior progress notes, procedures and testing.     ROS:   Review of Systems   Constitutional: Negative for activity change, chills and fever.   HENT: Positive for facial swelling. Negative for congestion, nosebleeds and trouble swallowing.    Eyes: Negative for photophobia, discharge and visual disturbance.   Respiratory: Negative for apnea, chest tightness and shortness of breath.    Cardiovascular: Negative for chest pain, palpitations and leg swelling.   Gastrointestinal: Negative for abdominal distention, abdominal pain, nausea and vomiting.   Genitourinary: Negative for difficulty urinating, dysuria and hematuria.   Musculoskeletal: Negative for arthralgias, gait problem, neck pain and neck stiffness.   Skin: Negative for color change, pallor, rash and wound.   Neurological: Negative for seizures, syncope and light-headedness.   Psychiatric/Behavioral: Negative for agitation, behavioral problems and confusion.       PROBLEM LIST:  Patient Active Problem List   Diagnosis    Encounter for gynecological examination without abnormal finding    Menopausal state    Hormone replacement therapy (HRT)    Status post hysterectomy    Breast cyst    Fibrocystic breast changes of both  breasts    Vaginitis    Mixed incontinence urge and stress    Bladder pain    Urinary, incontinence, stress female         HISTORY  Past Medical History:   Diagnosis Date    Anxiety     GERD (gastroesophageal reflux disease)     Hypertension        Past Surgical History:   Procedure Laterality Date    BACK SURGERY      BELT ABDOMINOPLASTY      BREAST BIOPSY      right breast- benign;     BREAST CYST ASPIRATION      bilateral (multiple)    BREAST SURGERY  2013    right breast excisional bx- benign     SECTION      x2    JOINT REPLACEMENT      knee    TOTAL ABDOMINAL HYSTERECTOMY      with BSO       Social History     Tobacco Use    Smoking status: Former Smoker     Types: Cigarettes    Smokeless tobacco: Former User   Substance Use Topics    Alcohol use: Yes     Comment: occasional    Drug use: Not on file       Family History   Problem Relation Age of Onset    Breast cancer Mother 80    Breast cancer Sister 69    Cancer Sister         Kidney CA and Breast CA    Breast cancer Paternal Aunt 40    Ovarian cancer Neg Hx          MEDS:  Current Outpatient Medications on File Prior to Visit   Medication Sig Dispense Refill    albuterol (PROVENTIL) 5 mg/mL nebulizer solution   4    amlodipine (NORVASC) 5 MG tablet Take 5 mg by mouth once daily.  3    aspirin (ECOTRIN) 81 MG EC tablet Take 81 mg by mouth.      baclofen (LIORESAL) 10 MG tablet TAKE ONE Tablet BY MOUTH THREE TIMES A DAY AS NEEDED FOR MUSCLE SPASMS MAY CAUSE DROWSINESS THANK YOU  1    cholecalciferol, vitamin D3, 50,000 unit capsule TAKE 1 CAPSULE BY MOUTH EVERY WEEK      cholecalciferol, vitamin D3, 50,000 unit capsule Take 50,000 Units by mouth every 7 days.  2    clindamycin (CLEOCIN) 150 MG capsule   2    cloNIDine (CATAPRES) 0.3 MG tablet Take 0.3 mg by mouth 3 (three) times daily.  3    DEPO-ESTRADIOL 5 mg/mL injection INJECT 1 ML (5 MG TOTAL) INTO THE MUSCLE EVERY 30 DAYS. 5 mL 3    duloxetine (CYMBALTA) 60 MG  capsule Take 60 mg by mouth nightly.  6    ESOMEPRAZOLE MAGNESIUM (NEXIUM ORAL) Take by mouth.      fluticasone-umeclidin-vilanter (TRELEGY ELLIPTA) 100-62.5-25 mcg DsDv Inhale 1 puff into the lungs.      furosemide (LASIX) 20 MG tablet Take 20 mg by mouth once daily.  3    gabapentin (NEURONTIN) 300 MG capsule   3    hydrocodone-acetaminophen 10-325mg (NORCO)  mg Tab TAKE ONE Tablet BY MOUTH EVERY 6 HOURS FOR BREAKTHROUGH PAIN THANK YOU  0    lorazepam (ATIVAN) 2 MG Tab Take 0.5 mg by mouth 2 (two) times daily.      losartan (COZAAR) 50 MG tablet Take 50 mg by mouth 2 (two) times daily.  3    LYRICA 150 mg capsule TAKE ONE Capsule BY MOUTH TWICE DAILY FOR LEG PAIN MAY CAUSE DROWSINESS THANK YOU  1    NEXIUM 40 mg capsule Take 40 mg by mouth every evening.  3    OPANA ER 40 mg TR12 TAKE ONE Tablet BY MOUTH THREE TIMES A DAY FOR PAIN MAY CAUSE DROWSINESS THANK YOU  0    oxybutynin (DITROPAN-XL) 5 MG TR24 TAKE 1 TABLET (5 MG TOTAL) BY MOUTH ONCE DAILY. 90 tablet 0    oxyCODONE-acetaminophen (PERCOCET)  mg per tablet Take 1 tablet by mouth.      potassium chloride (KLOR-CON 10) 10 MEQ TbSR TAKE 1 TABLET BY MOUTH TWICE A DAY      pramipexole (MIRAPEX) 1 MG tablet TAKE 2 TABLETS BY MOUTH PRIOR TO BEDTIME THANK YOU  5    PROAIR HFA 90 mcg/actuation inhaler Inhale 2 puffs into the lungs 4 (four) times daily.  4    promethazine (PHENERGAN) 25 MG tablet   0    rosuvastatin (CRESTOR) 10 MG tablet TAKE 1 TABLET BY MOUTH EVERYDAY AT BEDTIME      SYMBICORT 160-4.5 mcg/actuation HFAA   3    trazodone (DESYREL) 50 MG tablet TAKE ONE Tablet BY MOUTH EVERY NIGHT AT BEDTIME THANK YOU  5    triamterene-hydrochlorothiazide 75-50 mg (MAXZIDE) 75-50 mg per tablet Take 1 tablet by mouth once daily.  3    zolpidem (AMBIEN) 10 mg Tab   1     Current Facility-Administered Medications on File Prior to Visit   Medication Dose Route Frequency Provider Last Rate Last Dose    estradiol valerate injection 20 mg   20 mg Intramuscular Q21 Days Luis Brown MD        testosterone cypionate injection 50 mg  50 mg Intramuscular Q21 Days Luis Brown MD           ALLERGIES:  Review of patient's allergies indicates:   Allergen Reactions    Penicillins          VITALS:  Vitals:    06/13/19 1623   BP: (!) 116/57   Pulse: 63   Temp: 98.5 °F (36.9 °C)         PHYSICAL EXAM:  Physical Exam   Constitutional: She is oriented to person, place, and time. She appears well-developed and well-nourished. No distress.   HENT:   Head: Normocephalic.   Nose:       Eyes: Conjunctivae and EOM are normal. No scleral icterus.   Neck: Normal range of motion. Neck supple. No tracheal tenderness present. No tracheal deviation present.   Cardiovascular: Normal rate, regular rhythm and intact distal pulses.   Pulmonary/Chest: Effort normal and breath sounds normal. No accessory muscle usage or stridor. No respiratory distress. Right breast exhibits no inverted nipple, no mass, no nipple discharge, no skin change and no tenderness. Left breast exhibits no inverted nipple, no mass, no nipple discharge, no skin change and no tenderness. Breasts are symmetrical.   Abdominal: Soft. Normal appearance. She exhibits no distension, no ascites and no mass. There is no tenderness. There is no rebound. No hernia. Hernia confirmed negative in the ventral area, confirmed negative in the right inguinal area and confirmed negative in the left inguinal area.   Musculoskeletal: Normal range of motion. She exhibits no edema or deformity.   Lymphadenopathy:     She has no axillary adenopathy. No inguinal adenopathy noted on the right or left side.        Right: No inguinal and no supraclavicular adenopathy present.        Left: No inguinal and no supraclavicular adenopathy present.   Neurological: She is alert and oriented to person, place, and time. She exhibits normal muscle tone.   Skin: Skin is warm and dry. No rash noted. She is not diaphoretic. No erythema.    Psychiatric: She has a normal mood and affect. Her behavior is normal. Judgment and thought content normal.   Vitals reviewed.                LABS:  No results found for: WBC, RBC, HGB, HCT, PLT  No results found for: GLU, NA, K, CL, CO2, BUN, CREATININE, CALCIUM  No results found for: ALT, AST, GGT, ALKPHOS, BILITOT  No results found for: MG, PHOS      ASSESSMENT & PLAN:  73 y.o. female with laceration to the bridge of her nose  - the of the wound is greater than 12 hr old it will require closure due to the skin flap being slightly displaced  - wound was washed and then sutured closed reapproximating the skin edges, see procedure note for further details  - recommended the patient applying Neosporin to the area and avoiding sun like  - will have the patient return to clinic in 1 week for suture remove  - over-the-counter NSAIDs and Tylenol for pain relief  - call if any questions or concerns

## 2019-06-14 NOTE — PROCEDURES
06/14/2019    Bita Espinosa  [id number]    PROCEDURE PERFORMED:  Laceration repair - bridge of nose approximately 5 cm total length    PERFORMING SURGEON: Lane Be Jr    CONSENT:  The risks benefits and alternatives of the procedure were discussed with the patient. The patient was queried for questions and all concerns were addressed.  The patient provided written consent for the procedure.    ANESTHESIA:  Lidocaine 1% without epinephrine    INDICATION:  Laceration to the bridge of nose with skin flap malalignment    FINDINGS:  Tissue reapproximated, corners of the skin flap were mildly ischemic    PROCEDURE IN DETAIL:  After informed consent was obtained patient's laceration was 1st irrigated and cleaned.  It was then prepped with Betadine and draped.  1% lidocaine without epinephrine was injected into the surrounding skin we reapproximated the skin flap the appropriate skin edge with interrupted 6 0 nylon sutures. Corners of the skin flap per somewhat ischemic.  Pressure was held for hemostasis. Neosporin was applied over the wound then a pressure dressing was taped in place. Patient tolerated procedure well.    EBL:  20 cc    COMPLICATIONS:  None    CONDITION:  Stable    DISPO:  Return to clinic in 1 week for suture removal

## 2019-06-16 ENCOUNTER — TELEPHONE (OUTPATIENT)
Dept: URGENT CARE | Facility: CLINIC | Age: 74
End: 2019-06-16

## 2019-06-20 ENCOUNTER — OFFICE VISIT (OUTPATIENT)
Dept: SURGERY | Facility: CLINIC | Age: 74
End: 2019-06-20
Payer: MEDICARE

## 2019-06-20 VITALS
BODY MASS INDEX: 36.79 KG/M2 | SYSTOLIC BLOOD PRESSURE: 148 MMHG | TEMPERATURE: 98 F | HEIGHT: 62 IN | DIASTOLIC BLOOD PRESSURE: 71 MMHG | HEART RATE: 70 BPM | OXYGEN SATURATION: 95 % | WEIGHT: 199.94 LBS

## 2019-06-20 DIAGNOSIS — S01.21XD LACERATION OF NOSE, SUBSEQUENT ENCOUNTER: Primary | ICD-10-CM

## 2019-06-20 PROCEDURE — 99024 PR POST-OP FOLLOW-UP VISIT: ICD-10-PCS | Mod: S$GLB,,, | Performed by: SURGERY

## 2019-06-20 PROCEDURE — 99024 POSTOP FOLLOW-UP VISIT: CPT | Mod: S$GLB,,, | Performed by: SURGERY

## 2019-06-20 PROCEDURE — 99999 PR PBB SHADOW E&M-EST. PATIENT-LVL III: CPT | Mod: PBBFAC,,, | Performed by: SURGERY

## 2019-06-20 PROCEDURE — 99999 PR PBB SHADOW E&M-EST. PATIENT-LVL III: ICD-10-PCS | Mod: PBBFAC,,, | Performed by: SURGERY

## 2019-06-20 NOTE — LETTER
June 20, 2019      Hermila Sommer, JESSY  [address]  [facility] [address]           [facility]  [address]  [address] [facility]  [place] [address]  Phone: [phone]          Patient: Bita Espinosa   MR Number: [id number]   YOB: 1945   Date of Visit: 6/20/2019       Dear Hermila Sommer:    Thank you for referring Bita Espinosa to me for evaluation. Attached you will find relevant portions of my assessment and plan of care.    If you have questions, please do not hesitate to call me. I look forward to following Bita Espinosa along with you.    Sincerely,    Lane Be Jr., MD    Enclosure  CC:  No Recipients    If you would like to receive this communication electronically, please contact [email or web address] or [phone] to request more information on [facility] access.    For providers and/or their staff who would like to refer a patient to [facility], please contact us through our one-stop-shop provider referral line, Jarad Horvath, at [phone].    If you feel you have received this communication in error or would no longer like to receive these types of communications, please e-mail [email or web address]

## 2019-06-20 NOTE — PROGRESS NOTES
[facility]  PROGRESS NOTE    HPI: Bita Espinosa is a 73 y.o. female status post primary repair of the laceration on the bridge of her nose here for 1 week follow-up.  The overall doing well.  Has developed to black eyes but these are resolving.  Pain is well controlled.  States incision is healing well.  She has been keeping it covered.      VITALS:  Vitals:    06/20/19 1438   BP: (!) 148/71   Pulse: 70   Temp: 97.6 °F (36.4 °C)       PHYSICAL EXAM:  Repair intact, sutures in place, no evidence of infection, no erythema, no induration, no edema    Post suture removal picture below              ASSESSMENT & PLAN:  73 y.o. female s/p laceration bridge of nose, status post primary repair  - sutures removed  - encourage the patient to avoid direct sunlight and apply sunscreen as needed  - over-the-counter scar cream or silicone pads can be used to help reduce visible scarring, reassured her that over time the scar will lighten in color and smooth  - return to clinic p.r.n.

## 2021-01-15 ENCOUNTER — IMMUNIZATION (OUTPATIENT)
Dept: FAMILY MEDICINE | Facility: CLINIC | Age: 76
End: 2021-01-15
Payer: MEDICARE

## 2021-01-15 DIAGNOSIS — Z23 NEED FOR VACCINATION: Primary | ICD-10-CM

## 2021-01-15 PROCEDURE — 91300 COVID-19, MRNA, LNP-S, PF, 30 MCG/0.3 ML DOSE VACCINE: CPT | Mod: PBBFAC | Performed by: FAMILY MEDICINE

## 2021-02-05 ENCOUNTER — IMMUNIZATION (OUTPATIENT)
Dept: FAMILY MEDICINE | Facility: CLINIC | Age: 76
End: 2021-02-05
Payer: MEDICARE

## 2021-02-05 DIAGNOSIS — Z23 NEED FOR VACCINATION: Primary | ICD-10-CM

## 2021-02-05 PROCEDURE — 0002A COVID-19, MRNA, LNP-S, PF, 30 MCG/0.3 ML DOSE VACCINE: CPT | Mod: PBBFAC | Performed by: FAMILY MEDICINE

## 2021-02-05 PROCEDURE — 91300 COVID-19, MRNA, LNP-S, PF, 30 MCG/0.3 ML DOSE VACCINE: CPT | Mod: PBBFAC | Performed by: FAMILY MEDICINE

## 2021-10-26 NOTE — TELEPHONE ENCOUNTER
----- Message from Deanna Friedman sent at 6/15/2017  3:21 PM CDT -----  Contact: [phone]  Pt is following up on her referral for PT and pt is also leaking urine Please call pt at your earliest convenience.  Thanks ! Fax number [phone] and phone number [phone]      06/16/2017 9:07 AM  Spoke with pt already  
----- Message from Harpal Vega sent at 6/15/2017 11:04 AM CDT -----  Contact: Self  Pt states she needs a referral to [facility], Fax # [phone]. Pt can be reached @ [phone].      06/16/2017 9:06 AM  Spoke with pt and informed her that  is off today and when he returns to the office on Monday i will have him put in referral.  
18

## 2022-06-17 DIAGNOSIS — G56.03 CARPAL TUNNEL SYNDROME, BILATERAL: Primary | ICD-10-CM

## 2022-06-29 ENCOUNTER — TELEPHONE (OUTPATIENT)
Dept: NEUROLOGY | Facility: CLINIC | Age: 77
End: 2022-06-29
Payer: MEDICARE

## 2022-06-29 NOTE — TELEPHONE ENCOUNTER
----- Message from Jihan Sanchez MA sent at 6/29/2022  3:07 PM CDT -----  Type:  Patient Returning Call    Who Called: self    Who Left Message for Patient: Germania    Does the patient know what this is regarding?:yes    Would the patient rather a call back or a response via [facility]? yes    Best Call Back Number: [phone] (home)

## 2022-08-02 ENCOUNTER — PROCEDURE VISIT (OUTPATIENT)
Dept: NEUROLOGY | Facility: CLINIC | Age: 77
End: 2022-08-02
Payer: MEDICARE

## 2022-08-02 VITALS — WEIGHT: 199.94 LBS | HEIGHT: 62 IN | BODY MASS INDEX: 36.79 KG/M2

## 2022-08-02 DIAGNOSIS — G56.03 CARPAL TUNNEL SYNDROME, BILATERAL: ICD-10-CM

## 2022-08-02 PROCEDURE — 95912 NRV CNDJ TEST 11-12 STUDIES: CPT | Mod: S$GLB,,, | Performed by: NEUROLOGICAL SURGERY

## 2022-08-02 PROCEDURE — 95886 MUSC TEST DONE W/N TEST COMP: CPT | Mod: S$GLB,,, | Performed by: NEUROLOGICAL SURGERY

## 2022-08-02 PROCEDURE — 95912 PR NERVE CONDUCTION STUDY; 11 -12 STUDIES: ICD-10-PCS | Mod: S$GLB,,, | Performed by: NEUROLOGICAL SURGERY

## 2022-08-02 PROCEDURE — 95886 PR EMG COMPLETE, W/ NERVE CONDUCTION STUDIES, 5+ MUSCLES: ICD-10-PCS | Mod: S$GLB,,, | Performed by: NEUROLOGICAL SURGERY
